# Patient Record
Sex: FEMALE | Race: WHITE | NOT HISPANIC OR LATINO | URBAN - METROPOLITAN AREA
[De-identification: names, ages, dates, MRNs, and addresses within clinical notes are randomized per-mention and may not be internally consistent; named-entity substitution may affect disease eponyms.]

---

## 2017-10-05 ENCOUNTER — OUTPATIENT (OUTPATIENT)
Dept: OUTPATIENT SERVICES | Facility: HOSPITAL | Age: 22
LOS: 1 days | Discharge: HOME | End: 2017-10-05

## 2017-10-05 DIAGNOSIS — R22.0 LOCALIZED SWELLING, MASS AND LUMP, HEAD: ICD-10-CM

## 2017-10-18 ENCOUNTER — OUTPATIENT (OUTPATIENT)
Dept: OUTPATIENT SERVICES | Facility: HOSPITAL | Age: 22
LOS: 1 days | Discharge: HOME | End: 2017-10-18

## 2017-10-18 DIAGNOSIS — R13.10 DYSPHAGIA, UNSPECIFIED: ICD-10-CM

## 2018-10-22 ENCOUNTER — OUTPATIENT (OUTPATIENT)
Dept: OUTPATIENT SERVICES | Facility: HOSPITAL | Age: 23
LOS: 1 days | Discharge: HOME | End: 2018-10-22

## 2018-10-22 VITALS
DIASTOLIC BLOOD PRESSURE: 72 MMHG | HEIGHT: 66 IN | RESPIRATION RATE: 16 BRPM | SYSTOLIC BLOOD PRESSURE: 122 MMHG | WEIGHT: 130.07 LBS | OXYGEN SATURATION: 100 % | TEMPERATURE: 99 F | HEART RATE: 72 BPM

## 2018-10-22 DIAGNOSIS — Z01.818 ENCOUNTER FOR OTHER PREPROCEDURAL EXAMINATION: ICD-10-CM

## 2018-10-22 DIAGNOSIS — Z98.890 OTHER SPECIFIED POSTPROCEDURAL STATES: Chronic | ICD-10-CM

## 2018-10-22 LAB
APPEARANCE UR: CLEAR — SIGNIFICANT CHANGE UP
BASOPHILS # BLD AUTO: 0.04 K/UL — SIGNIFICANT CHANGE UP (ref 0–0.2)
BASOPHILS NFR BLD AUTO: 0.5 % — SIGNIFICANT CHANGE UP (ref 0–1)
BILIRUB UR-MCNC: NEGATIVE — SIGNIFICANT CHANGE UP
COLOR SPEC: YELLOW — SIGNIFICANT CHANGE UP
DIFF PNL FLD: NEGATIVE — SIGNIFICANT CHANGE UP
EOSINOPHIL # BLD AUTO: 0.16 K/UL — SIGNIFICANT CHANGE UP (ref 0–0.7)
EOSINOPHIL NFR BLD AUTO: 2.1 % — SIGNIFICANT CHANGE UP (ref 0–8)
EPI CELLS # UR: ABNORMAL /HPF
GLUCOSE UR QL: NEGATIVE MG/DL — SIGNIFICANT CHANGE UP
HCT VFR BLD CALC: 37.2 % — SIGNIFICANT CHANGE UP (ref 37–47)
HGB BLD-MCNC: 12.3 G/DL — SIGNIFICANT CHANGE UP (ref 12–16)
IMM GRANULOCYTES NFR BLD AUTO: 0.1 % — SIGNIFICANT CHANGE UP (ref 0.1–0.3)
KETONES UR-MCNC: NEGATIVE — SIGNIFICANT CHANGE UP
LEUKOCYTE ESTERASE UR-ACNC: ABNORMAL
LYMPHOCYTES # BLD AUTO: 1.57 K/UL — SIGNIFICANT CHANGE UP (ref 1.2–3.4)
LYMPHOCYTES # BLD AUTO: 21 % — SIGNIFICANT CHANGE UP (ref 20.5–51.1)
MCHC RBC-ENTMCNC: 30.1 PG — SIGNIFICANT CHANGE UP (ref 27–31)
MCHC RBC-ENTMCNC: 33.1 G/DL — SIGNIFICANT CHANGE UP (ref 32–37)
MCV RBC AUTO: 91 FL — SIGNIFICANT CHANGE UP (ref 81–99)
MONOCYTES # BLD AUTO: 0.39 K/UL — SIGNIFICANT CHANGE UP (ref 0.1–0.6)
MONOCYTES NFR BLD AUTO: 5.2 % — SIGNIFICANT CHANGE UP (ref 1.7–9.3)
NEUTROPHILS # BLD AUTO: 5.29 K/UL — SIGNIFICANT CHANGE UP (ref 1.4–6.5)
NEUTROPHILS NFR BLD AUTO: 71.1 % — SIGNIFICANT CHANGE UP (ref 42.2–75.2)
NITRITE UR-MCNC: NEGATIVE — SIGNIFICANT CHANGE UP
NRBC # BLD: 0 /100 WBCS — SIGNIFICANT CHANGE UP (ref 0–0)
PH UR: 7 — SIGNIFICANT CHANGE UP (ref 5–8)
PLATELET # BLD AUTO: 258 K/UL — SIGNIFICANT CHANGE UP (ref 130–400)
PROT UR-MCNC: NEGATIVE MG/DL — SIGNIFICANT CHANGE UP
RBC # BLD: 4.09 M/UL — LOW (ref 4.2–5.4)
RBC # FLD: 12.6 % — SIGNIFICANT CHANGE UP (ref 11.5–14.5)
SP GR SPEC: 1.02 — SIGNIFICANT CHANGE UP (ref 1.01–1.03)
UROBILINOGEN FLD QL: 0.2 MG/DL — SIGNIFICANT CHANGE UP (ref 0.2–0.2)
WBC # BLD: 7.46 K/UL — SIGNIFICANT CHANGE UP (ref 4.8–10.8)
WBC # FLD AUTO: 7.46 K/UL — SIGNIFICANT CHANGE UP (ref 4.8–10.8)
WBC UR QL: SIGNIFICANT CHANGE UP /HPF

## 2018-10-22 NOTE — H&P PST ADULT - REASON FOR ADMISSION
23 yr old female to past for bilateral breast augmentation due to hypomastia NO fever no uti uri cp palp sob pain at this time Exercise tolerance is > 3 flights no changes No jorge screen revd

## 2018-10-22 NOTE — H&P PST ADULT - ATTENDING COMMENTS
Pre-op: 23 y.o. woman for elective breast augmentation. Risks, benefits, alternatives discussed. Consent given.

## 2018-10-22 NOTE — H&P PST ADULT - MUSCULOSKELETAL
Normal rate, regular rhythm, normal S1, S2 heart sounds heard. No joint pain, swelling or deformity; no limitation of movement

## 2018-10-23 DIAGNOSIS — N64.0 FISSURE AND FISTULA OF NIPPLE: ICD-10-CM

## 2018-10-23 LAB
CULTURE RESULTS: SIGNIFICANT CHANGE UP
SPECIMEN SOURCE: SIGNIFICANT CHANGE UP

## 2018-10-30 ENCOUNTER — OUTPATIENT (OUTPATIENT)
Dept: OUTPATIENT SERVICES | Facility: HOSPITAL | Age: 23
LOS: 1 days | Discharge: HOME | End: 2018-10-30

## 2018-10-30 VITALS
WEIGHT: 130.07 LBS | HEIGHT: 65 IN | HEART RATE: 74 BPM | DIASTOLIC BLOOD PRESSURE: 74 MMHG | RESPIRATION RATE: 18 BRPM | OXYGEN SATURATION: 100 % | TEMPERATURE: 99 F | SYSTOLIC BLOOD PRESSURE: 121 MMHG

## 2018-10-30 VITALS — DIASTOLIC BLOOD PRESSURE: 58 MMHG | HEART RATE: 85 BPM | RESPIRATION RATE: 17 BRPM | SYSTOLIC BLOOD PRESSURE: 119 MMHG

## 2018-10-30 DIAGNOSIS — Z98.890 OTHER SPECIFIED POSTPROCEDURAL STATES: Chronic | ICD-10-CM

## 2018-10-30 RX ORDER — ONDANSETRON 8 MG/1
4 TABLET, FILM COATED ORAL ONCE
Qty: 0 | Refills: 0 | Status: DISCONTINUED | OUTPATIENT
Start: 2018-10-30 | End: 2018-10-30

## 2018-10-30 RX ORDER — SODIUM CHLORIDE 9 MG/ML
1000 INJECTION, SOLUTION INTRAVENOUS
Qty: 0 | Refills: 0 | Status: DISCONTINUED | OUTPATIENT
Start: 2018-10-30 | End: 2018-10-30

## 2018-10-30 RX ORDER — HYDROMORPHONE HYDROCHLORIDE 2 MG/ML
0.5 INJECTION INTRAMUSCULAR; INTRAVENOUS; SUBCUTANEOUS
Qty: 0 | Refills: 0 | Status: DISCONTINUED | OUTPATIENT
Start: 2018-10-30 | End: 2018-10-30

## 2018-10-30 RX ORDER — DIAZEPAM 5 MG
1 TABLET ORAL
Qty: 9 | Refills: 0 | OUTPATIENT
Start: 2018-10-30 | End: 2018-11-01

## 2018-10-30 RX ORDER — OXYCODONE AND ACETAMINOPHEN 5; 325 MG/1; MG/1
2 TABLET ORAL ONCE
Qty: 0 | Refills: 0 | Status: DISCONTINUED | OUTPATIENT
Start: 2018-10-30 | End: 2018-10-30

## 2018-10-30 RX ORDER — OXYCODONE AND ACETAMINOPHEN 5; 325 MG/1; MG/1
1 TABLET ORAL ONCE
Qty: 0 | Refills: 0 | Status: DISCONTINUED | OUTPATIENT
Start: 2018-10-30 | End: 2018-10-30

## 2018-10-30 RX ADMIN — HYDROMORPHONE HYDROCHLORIDE 0.5 MILLIGRAM(S): 2 INJECTION INTRAMUSCULAR; INTRAVENOUS; SUBCUTANEOUS at 11:59

## 2018-10-30 RX ADMIN — SODIUM CHLORIDE 100 MILLILITER(S): 9 INJECTION, SOLUTION INTRAVENOUS at 12:06

## 2018-10-30 RX ADMIN — HYDROMORPHONE HYDROCHLORIDE 0.5 MILLIGRAM(S): 2 INJECTION INTRAMUSCULAR; INTRAVENOUS; SUBCUTANEOUS at 12:13

## 2018-10-30 RX ADMIN — HYDROMORPHONE HYDROCHLORIDE 0.5 MILLIGRAM(S): 2 INJECTION INTRAMUSCULAR; INTRAVENOUS; SUBCUTANEOUS at 12:10

## 2018-10-30 NOTE — BRIEF OPERATIVE NOTE - PROCEDURE
<<-----Click on this checkbox to enter Procedure Augmentation of both breasts  10/30/2018    Active  [id number]

## 2018-11-02 DIAGNOSIS — N64.82 HYPOPLASIA OF BREAST: ICD-10-CM

## 2019-10-09 ENCOUNTER — OUTPATIENT (OUTPATIENT)
Dept: OUTPATIENT SERVICES | Facility: HOSPITAL | Age: 24
LOS: 1 days | Discharge: HOME | End: 2019-10-09
Payer: COMMERCIAL

## 2019-10-09 DIAGNOSIS — Z98.890 OTHER SPECIFIED POSTPROCEDURAL STATES: Chronic | ICD-10-CM

## 2019-10-09 DIAGNOSIS — M54.2 CERVICALGIA: ICD-10-CM

## 2019-10-09 PROCEDURE — 72050 X-RAY EXAM NECK SPINE 4/5VWS: CPT | Mod: 26

## 2019-10-18 ENCOUNTER — OUTPATIENT (OUTPATIENT)
Dept: OUTPATIENT SERVICES | Facility: HOSPITAL | Age: 24
LOS: 1 days | Discharge: HOME | End: 2019-10-18
Payer: COMMERCIAL

## 2019-10-18 DIAGNOSIS — Z98.890 OTHER SPECIFIED POSTPROCEDURAL STATES: Chronic | ICD-10-CM

## 2019-10-18 DIAGNOSIS — E04.1 NONTOXIC SINGLE THYROID NODULE: ICD-10-CM

## 2019-10-18 PROCEDURE — 76536 US EXAM OF HEAD AND NECK: CPT | Mod: 26

## 2019-10-18 NOTE — PRE-ANESTHESIA EVALUATION ADULT - NSANTHTOTALSCORECAL_ENT_A_CORE
Pt with orders to d/c IV and d/c. Tolerated d/c of IV. Awake ,alert, and oriented with no acute distress noted . Reviewed discharge orders including : medicine orders, prescriptions, followup appts, and patient education materials for diet and diagnosis. Belongings packed for transport  to rehab. Ambulating out at discharge with transport assistant    
0

## 2019-11-27 ENCOUNTER — OUTPATIENT (OUTPATIENT)
Dept: OUTPATIENT SERVICES | Facility: HOSPITAL | Age: 24
LOS: 1 days | Discharge: HOME | End: 2019-11-27
Payer: COMMERCIAL

## 2019-11-27 DIAGNOSIS — M54.2 CERVICALGIA: ICD-10-CM

## 2019-11-27 DIAGNOSIS — Z98.890 OTHER SPECIFIED POSTPROCEDURAL STATES: Chronic | ICD-10-CM

## 2019-11-27 PROCEDURE — 72141 MRI NECK SPINE W/O DYE: CPT | Mod: 26

## 2021-08-31 ENCOUNTER — TRANSCRIPTION ENCOUNTER (OUTPATIENT)
Age: 26
End: 2021-08-31

## 2022-04-18 NOTE — ASU PATIENT PROFILE, ADULT - ARRIVAL TIME
Routed to Dr. Pete for review and recommendation.  Last visit notes show fluoxetine was ordered but listed as past medication. Do you wish for this to be discontinued in [facility] and at [facility] pharmacy?       07:45

## 2022-08-02 ENCOUNTER — NON-APPOINTMENT (OUTPATIENT)
Age: 27
End: 2022-08-02

## 2022-11-02 NOTE — ASU PREOP CHECKLIST - TO WHOM
Dysuria   AMBULATORY CARE:   Dysuria  is trouble urinating, or pain, burning, or discomfort when you urinate  Dysuria is usually a symptom of another problem, such as a blockage or urinary tract infection  Common symptoms include the following:   Fever     Cloudy, bad smelling urine     Urge to urinate often but urinating little     Back, side, or abdominal pain     Blood in your urine     Discharge that smells bad     Itching    Seek care immediately if:   You have severe back, side, or abdominal pain  You have fever and shaking chills  You vomit several times in a row  Contact your healthcare provider if:   Your symptoms do not go away, even after treatment  You have questions or concerns about your condition or care  Treatment for dysuria  may include medicines to treat a bacterial infection or help decrease bladder spasms  Manage your dysuria:   Drink more liquids  Liquids help flush out bacteria that may be causing an infection  Ask your healthcare provider how much liquid to drink each day and which liquids are best for you  Take sitz baths as directed  Fill a bathtub with 4 to 6 inches of warm water  You may also use a sitz bath pan that fits over a toilet  Sit in the sitz bath for 20 minutes  Do this 2 to 3 times a day, or as directed  The warm water can help decrease pain and swelling  Follow up with your doctor as directed:  Write down your questions so you remember to ask them during your visits  © Copyright [employer] 2022 Information is for End User's use only and may not be sold, redistributed or otherwise used for commercial purposes  All illustrations and images included in CareNotes® are the copyrighted property of A D A M [employer] Inc  or [facility] [employer]   The above information is an  only  It is not intended as medical advice for individual conditions or treatments   Talk to your doctor, nurse or pharmacist before following any medical regimen to see if it is safe and effective for you  GUILLE KIM RN

## 2022-11-18 PROBLEM — Z00.00 ENCOUNTER FOR PREVENTIVE HEALTH EXAMINATION: Status: ACTIVE | Noted: 2022-11-18

## 2022-11-22 ENCOUNTER — APPOINTMENT (OUTPATIENT)
Dept: OBGYN | Facility: CLINIC | Age: 27
End: 2022-11-22
Payer: COMMERCIAL

## 2022-11-22 VITALS
BODY MASS INDEX: 24.59 KG/M2 | HEIGHT: 66 IN | DIASTOLIC BLOOD PRESSURE: 70 MMHG | SYSTOLIC BLOOD PRESSURE: 104 MMHG | WEIGHT: 153 LBS

## 2022-11-22 PROCEDURE — 99385 PREV VISIT NEW AGE 18-39: CPT

## 2022-11-22 RX ORDER — NORGESTREL AND ETHINYL ESTRADIOL 0.3-0.03MG
0.3-3 KIT ORAL DAILY
Qty: 3 | Refills: 1 | Status: ACTIVE | COMMUNITY
Start: 2022-11-22 | End: 1900-01-01

## 2022-11-22 NOTE — HISTORY OF PRESENT ILLNESS
[FreeTextEntry1] : Patient is 27 years old para 0-0-0-0 last menstrual period November 3, 2022.\par She is presently on oral contraceptives in the form of Cryselle and is requesting to continue with present oral contraceptives.  She is a former patient of Dr. John Abdul and states that prior to being on oral contraceptives her periods were noted to be painful and irregular.  Patient states that she has a history of high risk HPV, her last Pap was performed in August 2022 and noted to be within normal limits.  Patient states that she was noted to be chlamydia positive in June 2022 and was retested in August 2022 noted to be negative and retested in September 2022 noted to be negative.

## 2023-04-20 DIAGNOSIS — N76.2 ACUTE VULVITIS: ICD-10-CM

## 2023-04-20 RX ORDER — CLOTRIMAZOLE AND BETAMETHASONE DIPROPIONATE 10; .5 MG/G; MG/G
1-0.05 CREAM TOPICAL TWICE DAILY
Qty: 1 | Refills: 0 | Status: ACTIVE | COMMUNITY
Start: 2023-04-20 | End: 1900-01-01

## 2023-04-25 ENCOUNTER — APPOINTMENT (OUTPATIENT)
Dept: OBGYN | Facility: CLINIC | Age: 28
End: 2023-04-25
Payer: COMMERCIAL

## 2023-04-25 VITALS
DIASTOLIC BLOOD PRESSURE: 62 MMHG | HEIGHT: 66 IN | WEIGHT: 153 LBS | SYSTOLIC BLOOD PRESSURE: 102 MMHG | BODY MASS INDEX: 24.59 KG/M2

## 2023-04-25 DIAGNOSIS — Z30.41 ENCOUNTER FOR SURVEILLANCE OF CONTRACEPTIVE PILLS: ICD-10-CM

## 2023-04-25 DIAGNOSIS — Z78.9 OTHER SPECIFIED HEALTH STATUS: ICD-10-CM

## 2023-04-25 PROCEDURE — 99214 OFFICE O/P EST MOD 30 MIN: CPT

## 2023-04-25 RX ORDER — NORGESTREL AND ETHINYL ESTRADIOL 0.3-0.03MG
0.3-3 KIT ORAL DAILY
Qty: 3 | Refills: 1 | Status: ACTIVE | COMMUNITY
Start: 2023-04-25 | End: 1900-01-01

## 2023-04-26 ENCOUNTER — TRANSCRIPTION ENCOUNTER (OUTPATIENT)
Age: 28
End: 2023-04-26

## 2023-05-31 ENCOUNTER — APPOINTMENT (OUTPATIENT)
Dept: OBGYN | Facility: CLINIC | Age: 28
End: 2023-05-31

## 2023-10-03 ENCOUNTER — APPOINTMENT (OUTPATIENT)
Dept: OBGYN | Facility: CLINIC | Age: 28
End: 2023-10-03
Payer: COMMERCIAL

## 2023-10-03 VITALS
DIASTOLIC BLOOD PRESSURE: 72 MMHG | WEIGHT: 155 LBS | BODY MASS INDEX: 24.91 KG/M2 | SYSTOLIC BLOOD PRESSURE: 108 MMHG | HEIGHT: 66 IN

## 2023-10-03 DIAGNOSIS — N94.6 DYSMENORRHEA, UNSPECIFIED: ICD-10-CM

## 2023-10-03 DIAGNOSIS — N92.0 EXCESSIVE AND FREQUENT MENSTRUATION WITH REGULAR CYCLE: ICD-10-CM

## 2023-10-03 DIAGNOSIS — N72 INFLAMMATORY DISEASE OF CERVIX UTERI: ICD-10-CM

## 2023-10-03 PROCEDURE — 99214 OFFICE O/P EST MOD 30 MIN: CPT

## 2023-10-03 RX ORDER — NORGESTREL AND ETHINYL ESTRADIOL 0.3-0.03MG
0.3-3 KIT ORAL DAILY
Qty: 3 | Refills: 1 | Status: ACTIVE | COMMUNITY
Start: 2023-10-03 | End: 1900-01-01

## 2024-03-04 ENCOUNTER — APPOINTMENT (OUTPATIENT)
Dept: OBGYN | Facility: CLINIC | Age: 29
End: 2024-03-04
Payer: COMMERCIAL

## 2024-03-04 VITALS
BODY MASS INDEX: 24.11 KG/M2 | HEART RATE: 91 BPM | SYSTOLIC BLOOD PRESSURE: 123 MMHG | WEIGHT: 150 LBS | HEIGHT: 66 IN | DIASTOLIC BLOOD PRESSURE: 79 MMHG

## 2024-03-04 DIAGNOSIS — Z01.419 ENCOUNTER FOR GYNECOLOGICAL EXAMINATION (GENERAL) (ROUTINE) W/OUT ABNORMAL FINDINGS: ICD-10-CM

## 2024-03-04 DIAGNOSIS — Z30.41 ENCOUNTER FOR SURVEILLANCE OF CONTRACEPTIVE PILLS: ICD-10-CM

## 2024-03-04 PROCEDURE — 99395 PREV VISIT EST AGE 18-39: CPT

## 2024-03-04 RX ORDER — NORGESTREL AND ETHINYL ESTRADIOL 0.3-0.03MG
0.3-3 KIT ORAL DAILY
Qty: 3 | Refills: 1 | Status: ACTIVE | COMMUNITY
Start: 2024-03-04 | End: 1900-01-01

## 2024-03-04 NOTE — PROCEDURE
[Cervical Pap Smear] : cervical Pap smear [Liquid Base] : liquid base [No Complications] : there were no complications [Tolerated Well] : the patient tolerated the procedure well [Transvaginal Ultrasound] : transvaginal ultrasound [Anteverted] : anteverted [No Fibroid(s)] : no fibroid(s) [L: ___ cm] : L: [unfilled] cm [H: ___ cm] : H: [unfilled] cm [FreeTextEntry7] : 2.0 x 2.9 cm [FreeTextEntry9] : Back pain [FreeTextEntry8] : 1.6 x 2.9 cm

## 2024-03-04 NOTE — DISCUSSION/SUMMARY
[FreeTextEntry1] : Pap done Issues regarding cervicitis discussed with patient Continue oral contraceptives with instructions/precautions Keep menstrual calendar Follow-up 6 months or as needed

## 2024-03-04 NOTE — PHYSICAL EXAM
[Chaperone Present] : A chaperone was present in the examining room during all aspects of the physical examination [FreeTextEntry1] : Lanette [Appropriately responsive] : appropriately responsive [Alert] : alert [No Acute Distress] : no acute distress [No Lymphadenopathy] : no lymphadenopathy [Regular Rate Rhythm] : regular rate rhythm [No Murmurs] : no murmurs [Clear to Auscultation B/L] : clear to auscultation bilaterally [Soft] : soft [Non-tender] : non-tender [Non-distended] : non-distended [No HSM] : No HSM [No Lesions] : no lesions [No Mass] : no mass [Oriented x3] : oriented x3 [Labia Majora] : normal [Labia Minora] : normal [Normal] : normal [Uterine Adnexae] : normal [FreeTextEntry5] : Mild cervicitis noted

## 2024-03-04 NOTE — HISTORY OF PRESENT ILLNESS
[FreeTextEntry1] : Patient is 28 years old para 0-0-0-0 last menstrual period February 28, 2024 She is doing well on oral contraceptives in the form of Cryselle.   She notes improvement of painful and heavy menstrual periods on oral contraceptives and would like to continue with present oral contraceptives. Patient states that she notes occasional low back pain and feels like it may be job-related.  She is employed as a nurse.

## 2024-07-26 ENCOUNTER — APPOINTMENT (OUTPATIENT)
Dept: ORTHOPEDIC SURGERY | Facility: CLINIC | Age: 29
End: 2024-07-26
Payer: COMMERCIAL

## 2024-07-26 VITALS — WEIGHT: 150 LBS | HEIGHT: 66 IN | BODY MASS INDEX: 24.11 KG/M2

## 2024-07-26 DIAGNOSIS — S16.1XXA STRAIN OF MUSCLE, FASCIA AND TENDON AT NECK LEVEL, INITIAL ENCOUNTER: ICD-10-CM

## 2024-07-26 DIAGNOSIS — M25.511 PAIN IN RIGHT SHOULDER: ICD-10-CM

## 2024-07-26 DIAGNOSIS — Z78.9 OTHER SPECIFIED HEALTH STATUS: ICD-10-CM

## 2024-07-26 PROCEDURE — 99203 OFFICE O/P NEW LOW 30 MIN: CPT | Mod: 25

## 2024-07-26 PROCEDURE — 20552 NJX 1/MLT TRIGGER POINT 1/2: CPT | Mod: RT

## 2024-07-26 PROCEDURE — 73030 X-RAY EXAM OF SHOULDER: CPT | Mod: RT

## 2024-07-26 RX ORDER — DICLOFENAC SODIUM 75 MG/1
75 TABLET, DELAYED RELEASE ORAL
Qty: 60 | Refills: 0 | Status: ACTIVE | COMMUNITY
Start: 2024-07-26 | End: 1900-01-01

## 2024-07-26 RX ORDER — TIZANIDINE 4 MG/1
4 TABLET ORAL
Qty: 30 | Refills: 0 | Status: ACTIVE | COMMUNITY
Start: 2024-07-26 | End: 1900-01-01

## 2024-07-26 NOTE — PHYSICAL EXAM
[Rotation to right] : rotation to right [NL (0-180)] : full active abduction 0-180 degrees [NL (0-70)] : full internal rotation 0-70 degrees [NL (0-90)] : full external rotation 0-90 degrees [Right] : right shoulder [There are no fractures, subluxations or dislocations. No significant abnormalities are seen] : There are no fractures, subluxations or dislocations. No significant abnormalities are seen [] : negative Augustus

## 2024-07-26 NOTE — HISTORY OF PRESENT ILLNESS
[de-identified] : 29-year-old female here for an evaluation of pain to the right shoulder, patient states that the pain is mainly over the top of the shoulder and radiates to the shoulder blade. She does not recall any trauma or any injury. Patient states that she started notices this pain on July 19, 2024, and she took some muscle relaxant with mild improvement of symptoms. Patient states that this pain is worse when she sleeps. And impinges on the mobility of her shoulder. The pain at rest is 4/10, pain with activity is 9/10.  Patient denies any medical problems. He denies any allergies to medication. Currently is taking ibuprofen and tizanidine.

## 2024-08-28 ENCOUNTER — APPOINTMENT (OUTPATIENT)
Dept: OBGYN | Facility: CLINIC | Age: 29
End: 2024-08-28
Payer: COMMERCIAL

## 2024-08-28 VITALS
WEIGHT: 157 LBS | BODY MASS INDEX: 25.23 KG/M2 | HEIGHT: 66 IN | DIASTOLIC BLOOD PRESSURE: 79 MMHG | SYSTOLIC BLOOD PRESSURE: 135 MMHG

## 2024-08-28 DIAGNOSIS — N72 INFLAMMATORY DISEASE OF CERVIX UTERI: ICD-10-CM

## 2024-08-28 DIAGNOSIS — Z86.19 PERSONAL HISTORY OF OTHER INFECTIOUS AND PARASITIC DISEASES: ICD-10-CM

## 2024-08-28 DIAGNOSIS — N92.0 EXCESSIVE AND FREQUENT MENSTRUATION WITH REGULAR CYCLE: ICD-10-CM

## 2024-08-28 DIAGNOSIS — N94.6 DYSMENORRHEA, UNSPECIFIED: ICD-10-CM

## 2024-08-28 DIAGNOSIS — Z30.41 ENCOUNTER FOR SURVEILLANCE OF CONTRACEPTIVE PILLS: ICD-10-CM

## 2024-08-28 PROCEDURE — 99214 OFFICE O/P EST MOD 30 MIN: CPT | Mod: 25

## 2024-08-28 PROCEDURE — 99459 PELVIC EXAMINATION: CPT

## 2024-08-28 RX ORDER — NORGESTREL AND ETHINYL ESTRADIOL 0.3-0.03MG
0.3-3 KIT ORAL DAILY
Qty: 3 | Refills: 1 | Status: ACTIVE | COMMUNITY
Start: 2024-08-28 | End: 1900-01-01

## 2024-08-28 NOTE — HISTORY OF PRESENT ILLNESS
[FreeTextEntry1] : Patient is 29 years old para 0-0-0-0 last menstrual period August 1, 2024 She has a history of high risk HPV Patient has a history of painful and heavy menstrual periods and notes improvement of symptoms with oral contraceptives. She is requesting to continue her present oral contraceptives.

## 2024-08-28 NOTE — PHYSICAL EXAM
[Chaperone Present] : A chaperone was present in the examining room during all aspects of the physical examination [FreeTextEntry2] : Yoana [Appropriately responsive] : appropriately responsive [Alert] : alert [No Acute Distress] : no acute distress [No Lymphadenopathy] : no lymphadenopathy [Regular Rate Rhythm] : regular rate rhythm [No Murmurs] : no murmurs [Clear to Auscultation B/L] : clear to auscultation bilaterally [Soft] : soft [Non-tender] : non-tender [Non-distended] : non-distended [No HSM] : No HSM [No Lesions] : no lesions [No Mass] : no mass [Oriented x3] : oriented x3 [Labia Majora] : normal [Labia Minora] : normal [Normal] : normal [Uterine Adnexae] : normal [FreeTextEntry5] : Mild cervicitis noted

## 2025-02-04 ENCOUNTER — APPOINTMENT (OUTPATIENT)
Dept: OBGYN | Facility: CLINIC | Age: 30
End: 2025-02-04
Payer: COMMERCIAL

## 2025-02-04 VITALS
WEIGHT: 159 LBS | HEIGHT: 66 IN | BODY MASS INDEX: 25.55 KG/M2 | SYSTOLIC BLOOD PRESSURE: 106 MMHG | DIASTOLIC BLOOD PRESSURE: 69 MMHG

## 2025-02-04 DIAGNOSIS — R63.5 ABNORMAL WEIGHT GAIN: ICD-10-CM

## 2025-02-04 DIAGNOSIS — N92.0 EXCESSIVE AND FREQUENT MENSTRUATION WITH REGULAR CYCLE: ICD-10-CM

## 2025-02-04 DIAGNOSIS — L70.9 ACNE, UNSPECIFIED: ICD-10-CM

## 2025-02-04 DIAGNOSIS — N94.6 DYSMENORRHEA, UNSPECIFIED: ICD-10-CM

## 2025-02-04 PROCEDURE — 99459 PELVIC EXAMINATION: CPT

## 2025-02-04 PROCEDURE — 99214 OFFICE O/P EST MOD 30 MIN: CPT | Mod: 25

## 2025-02-04 RX ORDER — NORGESTREL AND ETHINYL ESTRADIOL 0.3-0.03MG
0.3-3 KIT ORAL DAILY
Qty: 1 | Refills: 0 | Status: ACTIVE | COMMUNITY
Start: 2025-02-04 | End: 1900-01-01

## 2025-07-10 ENCOUNTER — APPOINTMENT (OUTPATIENT)
Dept: ORTHOPEDIC SURGERY | Facility: CLINIC | Age: 30
End: 2025-07-10
Payer: OTHER MISCELLANEOUS

## 2025-07-10 ENCOUNTER — NON-APPOINTMENT (OUTPATIENT)
Age: 30
End: 2025-07-10

## 2025-07-10 PROCEDURE — 72050 X-RAY EXAM NECK SPINE 4/5VWS: CPT

## 2025-07-10 PROCEDURE — 99203 OFFICE O/P NEW LOW 30 MIN: CPT | Mod: ACP

## 2025-07-10 PROCEDURE — 73030 X-RAY EXAM OF SHOULDER: CPT | Mod: RT

## 2025-07-10 RX ORDER — NABUMETONE 750 MG/1
750 TABLET, FILM COATED ORAL
Qty: 60 | Refills: 0 | Status: ACTIVE | COMMUNITY
Start: 2025-07-10 | End: 1900-01-01

## 2025-08-04 ENCOUNTER — APPOINTMENT (OUTPATIENT)
Dept: ORTHOPEDIC SURGERY | Facility: CLINIC | Age: 30
End: 2025-08-04
Payer: OTHER MISCELLANEOUS

## 2025-08-04 DIAGNOSIS — M75.101 UNSPECIFIED ROTATOR CUFF TEAR OR RUPTURE OF RIGHT SHOULDER, NOT SPECIFIED AS TRAUMATIC: ICD-10-CM

## 2025-08-04 PROCEDURE — 99214 OFFICE O/P EST MOD 30 MIN: CPT | Mod: ACP

## 2025-08-19 ENCOUNTER — APPOINTMENT (OUTPATIENT)
Dept: OBGYN | Facility: CLINIC | Age: 30
End: 2025-08-19
Payer: COMMERCIAL

## 2025-08-19 VITALS
HEIGHT: 66 IN | SYSTOLIC BLOOD PRESSURE: 116 MMHG | BODY MASS INDEX: 24.91 KG/M2 | DIASTOLIC BLOOD PRESSURE: 69 MMHG | WEIGHT: 155 LBS

## 2025-08-19 DIAGNOSIS — N76.2 ACUTE VULVITIS: ICD-10-CM

## 2025-08-19 DIAGNOSIS — Z01.411 ENCOUNTER FOR GYNECOLOGICAL EXAMINATION (GENERAL) (ROUTINE) WITH ABNORMAL FINDINGS: ICD-10-CM

## 2025-08-19 DIAGNOSIS — N94.6 DYSMENORRHEA, UNSPECIFIED: ICD-10-CM

## 2025-08-19 DIAGNOSIS — R45.89 OTHER SYMPTOMS AND SIGNS INVOLVING EMOTIONAL STATE: ICD-10-CM

## 2025-08-19 PROCEDURE — 99213 OFFICE O/P EST LOW 20 MIN: CPT | Mod: 25

## 2025-08-19 PROCEDURE — 99395 PREV VISIT EST AGE 18-39: CPT

## 2025-08-19 PROCEDURE — 99459 PELVIC EXAMINATION: CPT

## 2025-08-19 RX ORDER — CLOTRIMAZOLE AND BETAMETHASONE DIPROPIONATE 10; .5 MG/G; MG/G
1-0.05 CREAM TOPICAL TWICE DAILY
Qty: 1 | Refills: 0 | Status: ACTIVE | COMMUNITY
Start: 2025-08-19 | End: 1900-01-01

## 2025-08-19 RX ORDER — NORGESTREL AND ETHINYL ESTRADIOL 0.3-0.03MG
0.3-3 KIT ORAL DAILY
Qty: 3 | Refills: 1 | Status: ACTIVE | COMMUNITY
Start: 2025-08-19 | End: 1900-01-01

## 2025-08-25 ENCOUNTER — APPOINTMENT (OUTPATIENT)
Dept: ORTHOPEDIC SURGERY | Facility: CLINIC | Age: 30
End: 2025-08-25
Payer: OTHER MISCELLANEOUS

## 2025-08-25 DIAGNOSIS — S16.1XXA STRAIN OF MUSCLE, FASCIA AND TENDON AT NECK LEVEL, INITIAL ENCOUNTER: ICD-10-CM

## 2025-08-25 DIAGNOSIS — M75.101 UNSPECIFIED ROTATOR CUFF TEAR OR RUPTURE OF RIGHT SHOULDER, NOT SPECIFIED AS TRAUMATIC: ICD-10-CM

## 2025-08-25 DIAGNOSIS — M25.511 PAIN IN RIGHT SHOULDER: ICD-10-CM

## 2025-08-25 PROCEDURE — 99214 OFFICE O/P EST MOD 30 MIN: CPT | Mod: ACP

## 2025-09-16 ENCOUNTER — APPOINTMENT (OUTPATIENT)
Dept: ORTHOPEDIC SURGERY | Facility: CLINIC | Age: 30
End: 2025-09-16

## 2025-09-16 DIAGNOSIS — M25.511 PAIN IN RIGHT SHOULDER: ICD-10-CM
